# Patient Record
Sex: MALE | Race: WHITE
[De-identification: names, ages, dates, MRNs, and addresses within clinical notes are randomized per-mention and may not be internally consistent; named-entity substitution may affect disease eponyms.]

---

## 2019-07-18 NOTE — OR
DATE OF SURGERY:  07/18/2019.

 

REFERRING PROVIDER:  Tracey Gore DO.

 

PRE-OPERATIVE DIAGNOSES:

1. History of colon polyps.  Last colonoscopy 3 years ago, revealed multiple

    adenomas.

2. Positive family history of colon cancer.

3. Positive family history of colon polyps.

 

POST-OPERATIVE DIAGNOSES:

1. Total of 6 polyps removed.

    a.     2 mm polyp x3 in the cecum.

    b.     8 mm polyp at 85 cm, removed with hot snare.

    c.     2 mm polyp x2 at 80 cm.

2. Significant diverticulosis, left greater than right.

3. Normal distal ileum.

 

PROCEDURE:  Colonoscopy with polypectomy x6 (5 using cold forceps and 1 using

hot snare).

 

SURGEON:  Douglas Bonds M.D.

ANESTHESIA:  Monitored anesthesia care.

BOWEL PREP:  Good to fair.

 

Chip is a 63-year-old male, who was brought to the endoscopy suite after

discussing risks and benefits of the procedure.  Informed consent was obtained

for conscious sedation and colonoscopy with or without biopsy and/or

polypectomy.  We also discussed possibility of missed lesions.  Pre-procedure

exam was unremarkable.  IV, oxygen, and monitors were placed.  The patient was

placed in the left lateral decubitus position.  Sedation was administered and a

digital rectal exam was performed and unremarkable.  Colonoscope was passed into

the rectum and slowly advanced all the way to the cecum.  Cecum was viewed and

photographed. The ileocecal valve was intubated and distal ileum was normal in

appearance.  The cecum did reveal 2 mm polyp x3, which were all removed using

cold forceps.  The ascending colon revealed 8 mm polyp at 85 cm, removed with

hot snare as well as 2 mm polyp x2 at 80 cm, removed with cold forceps.  The

entire colon did reveal significant diverticulosis, left greater than right.

Otherwise, the transverse colon unremarkable, descending colon unremarkable,

sigmoid colon unremarkable.  Retroflexion was performed and rectal mucosa was

unremarkable.  Scope was removed.  The patient tolerated the procedure well.

The patient was monitored until that baseline status.  Discharge instructions

were reviewed and the patient was discharged in good condition.

 

COMPLICATIONS:  None.

TOTAL TIME:  26 minutes.

ESTIMATED BLOOD LOSS:  About 1 mL.

 

RECOMMENDATIONS/FOLLOW-UP:  We will await results of path report to determine

ideal followup interval.  I will have the patient hold his aspirin for 3 days to

limit any chance of bleeding.  I would like to kindly thank Tracey Gore for

this referral.

 

 

DMB:  07/18/2019 12:43:27  MODL:  07/18/2019 13:03:16

Job #:  [id number]

MTDD

## 2022-08-25 ENCOUNTER — HOSPITAL ENCOUNTER (OUTPATIENT)
Dept: HOSPITAL 50 - VM.SDS | Age: 67
Discharge: HOME | End: 2022-08-25
Attending: FAMILY MEDICINE
Payer: MEDICARE

## 2022-08-25 VITALS — HEART RATE: 58 BPM

## 2022-08-25 VITALS — DIASTOLIC BLOOD PRESSURE: 41 MMHG | SYSTOLIC BLOOD PRESSURE: 98 MMHG

## 2022-08-25 DIAGNOSIS — I10: ICD-10-CM

## 2022-08-25 DIAGNOSIS — Z79.82: ICD-10-CM

## 2022-08-25 DIAGNOSIS — E66.9: ICD-10-CM

## 2022-08-25 DIAGNOSIS — K63.5: ICD-10-CM

## 2022-08-25 DIAGNOSIS — Z79.899: ICD-10-CM

## 2022-08-25 DIAGNOSIS — M10.9: ICD-10-CM

## 2022-08-25 DIAGNOSIS — F41.9: ICD-10-CM

## 2022-08-25 DIAGNOSIS — Z12.11: Primary | ICD-10-CM

## 2022-08-25 DIAGNOSIS — E78.1: ICD-10-CM

## 2022-08-25 DIAGNOSIS — E11.9: ICD-10-CM

## 2022-08-25 DIAGNOSIS — K57.30: ICD-10-CM

## 2022-08-25 DIAGNOSIS — Z80.0: ICD-10-CM

## 2022-08-25 DIAGNOSIS — Z98.890: ICD-10-CM

## 2022-08-25 DIAGNOSIS — K62.1: ICD-10-CM

## 2022-08-25 DIAGNOSIS — K21.9: ICD-10-CM

## 2025-05-29 ENCOUNTER — HOSPITAL ENCOUNTER (EMERGENCY)
Dept: HOSPITAL 50 - VM.ED | Age: 70
Discharge: HOME | End: 2025-05-29
Payer: MEDICARE

## 2025-05-29 VITALS — SYSTOLIC BLOOD PRESSURE: 137 MMHG | DIASTOLIC BLOOD PRESSURE: 63 MMHG

## 2025-05-29 VITALS — HEART RATE: 62 BPM

## 2025-05-29 DIAGNOSIS — E78.00: ICD-10-CM

## 2025-05-29 DIAGNOSIS — E11.9: ICD-10-CM

## 2025-05-29 DIAGNOSIS — R07.89: Primary | ICD-10-CM

## 2025-05-29 DIAGNOSIS — I10: ICD-10-CM

## 2025-05-29 DIAGNOSIS — Z79.82: ICD-10-CM

## 2025-05-29 DIAGNOSIS — Z79.899: ICD-10-CM

## 2025-05-29 DIAGNOSIS — E66.9: ICD-10-CM

## 2025-05-29 LAB
ALBUMIN SERPL-MCNC: 3.9 G/DL (ref 3.4–5)
BASOPHILS NFR BLD AUTO: 0.3 % (ref 0.2–1.2)
BILIRUB SERPL-MCNC: 0.6 MG/DL (ref 0.2–1)
CALCIUM SERPL-MCNC: 9.3 MG/DL (ref 8.5–10.1)
CREAT CL 24H UR+SERPL-VRATE: 73.21 ML/MIN
EOSINOPHIL NFR BLD AUTO: 0.4 % (ref 0–4)
GLOBULIN SER-MCNC: 3.9 G/DL
HCT VFR BLD AUTO: 43.8 % (ref 40–52)
HGB BLD-MCNC: 15.2 G/DL (ref 14–18)
IMM GRANULOCYTES # BLD: 0.01 X10^3/UL (ref 0–0.07)
LYMPHOCYTES # BLD AUTO: 1.1 X10^3/UL (ref 1–4.8)
LYMPHOCYTES NFR BLD AUTO: 13.2 % (ref 25–50)
MCH RBC QN AUTO: 30.9 PG (ref 26–32)
MCHC RBC AUTO-ENTMCNC: 34.7 G/DL (ref 32–36)
MONOCYTES # BLD AUTO: 0.7 X10^3/UL (ref 0–0.8)
MONOCYTES NFR BLD AUTO: 8.2 % (ref 2–11)
NEUTROPHILS # BLD AUTO: 6.2 X10^3/UL (ref 1.8–7.7)
NEUTROPHILS NFR BLD AUTO: 77.8 % (ref 50–80)
PLATELET # BLD AUTO: 186 X10^3/UL (ref 130–400)
PROT SERPL-MCNC: 7.8 G/DL (ref 6.4–8.2)
RBC # BLD AUTO: 4.92 X10^6/UL (ref 4.5–6)

## 2025-05-31 ENCOUNTER — HOSPITAL ENCOUNTER (EMERGENCY)
Dept: HOSPITAL 50 - VM.ED | Age: 70
Discharge: HOME | End: 2025-05-31
Payer: MEDICARE

## 2025-05-31 VITALS — SYSTOLIC BLOOD PRESSURE: 136 MMHG | DIASTOLIC BLOOD PRESSURE: 68 MMHG

## 2025-05-31 VITALS — HEART RATE: 61 BPM

## 2025-05-31 DIAGNOSIS — I10: ICD-10-CM

## 2025-05-31 DIAGNOSIS — E11.9: ICD-10-CM

## 2025-05-31 DIAGNOSIS — Z79.899: ICD-10-CM

## 2025-05-31 DIAGNOSIS — Z79.82: ICD-10-CM

## 2025-05-31 DIAGNOSIS — M79.605: Primary | ICD-10-CM

## 2025-05-31 DIAGNOSIS — R07.89: ICD-10-CM

## 2025-05-31 DIAGNOSIS — M79.604: ICD-10-CM

## 2025-05-31 DIAGNOSIS — E66.9: ICD-10-CM

## 2025-05-31 DIAGNOSIS — E78.00: ICD-10-CM

## 2025-05-31 LAB
ALBUMIN SERPL-MCNC: 3.7 G/DL (ref 3.4–5)
ALBUMIN/GLOB SERPL: 1.09 {RATIO}
ANION GAP SERPL CALC-SCNC: 11.6 MMOL/L (ref 5–15)
BASOPHILS NFR BLD AUTO: 0.1 % (ref 0.2–1.2)
BILIRUB SERPL-MCNC: 0.5 MG/DL (ref 0.2–1)
CALCIUM SERPL-MCNC: 8.6 MG/DL (ref 8.5–10.1)
CREAT CL 24H UR+SERPL-VRATE: 73.21 ML/MIN
EOSINOPHIL # BLD AUTO: 0.1 X10^3/UL (ref 0–0.5)
EOSINOPHIL NFR BLD AUTO: 0.7 % (ref 0–4)
GLOBULIN SER-MCNC: 3.4 G/DL
HGB BLD-MCNC: 14.5 G/DL (ref 14–18)
IMM GRANULOCYTES # BLD: 0.02 X10^3/UL (ref 0–0.07)
LYMPHOCYTES # BLD AUTO: 1.3 X10^3/UL (ref 1–4.8)
LYMPHOCYTES NFR BLD AUTO: 15.6 % (ref 25–50)
MCH RBC QN AUTO: 31.5 PG (ref 26–32)
MCHC RBC AUTO-ENTMCNC: 35.4 G/DL (ref 32–36)
MCHC RBC AUTO-ENTMCNC: 88.9 FL (ref 78–93)
MONOCYTES # BLD AUTO: 0.8 X10^3/UL (ref 0–0.8)
MONOCYTES NFR BLD AUTO: 8.7 % (ref 2–11)
NEUTROPHILS # BLD AUTO: 6.4 X10^3/UL (ref 1.8–7.7)
NEUTROPHILS NFR BLD AUTO: 74.7 % (ref 50–80)
PLATELET # BLD AUTO: 151 X10^3/UL (ref 130–400)
POTASSIUM SERPL-SCNC: 3.6 MMOL/L (ref 3.5–5.1)
PROT SERPL-MCNC: 7.1 G/DL (ref 6.4–8.2)
RBC # BLD AUTO: 4.61 X10^6/UL (ref 4.5–6)
WBC # BLD AUTO: 8.6 X10^3/UL (ref 4–10)